# Patient Record
Sex: MALE | Race: WHITE | NOT HISPANIC OR LATINO | URBAN - NONMETROPOLITAN AREA
[De-identification: names, ages, dates, MRNs, and addresses within clinical notes are randomized per-mention and may not be internally consistent; named-entity substitution may affect disease eponyms.]

---

## 2021-01-06 ENCOUNTER — IMPORTED ENCOUNTER (OUTPATIENT)
Dept: URBAN - NONMETROPOLITAN AREA CLINIC 1 | Facility: CLINIC | Age: 51
End: 2021-01-06

## 2021-01-06 PROCEDURE — 92310 CONTACT LENS FITTING OU: CPT

## 2021-01-06 PROCEDURE — 92014 COMPRE OPH EXAM EST PT 1/>: CPT

## 2021-01-06 PROCEDURE — 92015 DETERMINE REFRACTIVE STATE: CPT

## 2021-03-22 PROBLEM — E11.9: Noted: 2021-03-22

## 2021-03-22 PROBLEM — H52.4: Noted: 2017-11-14

## 2021-03-22 PROBLEM — H52.13: Noted: 2017-11-14

## 2021-03-22 PROBLEM — H52.223: Noted: 2017-11-14

## 2022-04-09 ASSESSMENT — VISUAL ACUITY
OU_SC: 20/30+
OS_SC: 20/20
OU_CC: 20/30+
OD_SC: 20/20

## 2022-04-09 ASSESSMENT — TONOMETRY
OD_IOP_MMHG: 20
OS_IOP_MMHG: 19